# Patient Record
Sex: MALE | URBAN - METROPOLITAN AREA
[De-identification: names, ages, dates, MRNs, and addresses within clinical notes are randomized per-mention and may not be internally consistent; named-entity substitution may affect disease eponyms.]

---

## 2024-10-14 ENCOUNTER — APPOINTMENT (OUTPATIENT)
Dept: URBAN - METROPOLITAN AREA SURGERY 9 | Age: 34
Setting detail: DERMATOLOGY
End: 2024-10-15

## 2024-10-14 DIAGNOSIS — L21.8 OTHER SEBORRHEIC DERMATITIS: ICD-10-CM

## 2024-10-14 DIAGNOSIS — L64.8 OTHER ANDROGENIC ALOPECIA: ICD-10-CM

## 2024-10-14 PROCEDURE — OTHER COUNSELING: OTHER

## 2024-10-14 PROCEDURE — 99204 OFFICE O/P NEW MOD 45 MIN: CPT

## 2024-10-14 PROCEDURE — OTHER MIPS QUALITY: OTHER

## 2024-10-14 PROCEDURE — OTHER PRESCRIPTION: OTHER

## 2024-10-14 PROCEDURE — OTHER PRESCRIPTION MEDICATION MANAGEMENT: OTHER

## 2024-10-14 RX ORDER — KETOCONAZOLE 20 MG/ML
SHAMPOO, SUSPENSION TOPICAL
Qty: 120 | Refills: 11 | Status: ERX | COMMUNITY
Start: 2024-10-14

## 2024-10-14 ASSESSMENT — LOCATION SIMPLE DESCRIPTION DERM: LOCATION SIMPLE: POSTERIOR SCALP

## 2024-10-14 ASSESSMENT — LOCATION ZONE DERM: LOCATION ZONE: SCALP

## 2024-10-14 ASSESSMENT — LOCATION DETAILED DESCRIPTION DERM: LOCATION DETAILED: POSTERIOR MID-PARIETAL SCALP

## 2024-10-14 NOTE — HPI: HAIR LOSS
How Did The Hair Loss Occur?: gradual in onset
What Hair Products Do You Use?: Coconut oil \\nBiotin and collagen shampoos

## 2024-10-14 NOTE — PROCEDURE: MIPS QUALITY
ANTICOAGULATION SERVICE    Date of Clinic Visit:  1/31/2024    Javier Bean is a 70 y.o. male who presents to clinic today for anticoagulation monitoring and adjustment.    Recent INR Results:  Internal QC passed  Lab Results   Component Value Date    INR 2.5 01/31/2024    INR 3.2 01/10/2024       Current Warfarin Dosage:  Dosing Plan  As of 1/31/2024      TTR:  75.6 % (3.3 y)   Full warfarin instructions:  7.5 mg every Sat; 5 mg all other days                 Assessment/Plan:    Continue current regimen as INR remains stable. Patient was at top of range at last visit.  INR nicely in range re-check 2 weeks.    Next Clinic Appointment:  Return date  As of 1/31/2024      TTR:  75.6 % (3.3 y)   Next INR check:  2/14/2024               Please call [facility] at [phone] with any questions.     Thanks!  Sari Moise [facility]  Anticoagulation Service Pharmacist  1/31/2024 8:23 AM  For Pharmacy Admin Tracking Only    Intervention Detail: Dose Adjustment: 0, reason: Patient Preference  Total # of Interventions Recommended: 0  Total # of Interventions Accepted: 0  Time Spent (min): 15    
Detail Level: Simple
Quality 226: Preventive Care And Screening: Tobacco Use: Screening And Cessation Intervention: Patient screened for tobacco use and is an ex/non-smoker

## 2024-10-14 NOTE — PROCEDURE: PRESCRIPTION MEDICATION MANAGEMENT
Detail Level: Simple
Plan: Discussed treatment options include use of topical minoxidil vs compounded minoxidil/finasteride from [facility] vs PO finasteride vs PO minoxidil vs topical minoxidil and PO finasteride\\nhandout with more information provided regarding diagnosis and information regarding treatment options from [facility]\\n\\nHe elected to start OTC minoxidil. Will also start ketoconazole shampoo as has mild seb derm and for anti-androgen effect\\n\\nPhoto taken to compare at follow up if needed
Render In Strict Bullet Format?: No
Initiate Treatment: ketoconazole shampoo